# Patient Record
(demographics unavailable — no encounter records)

---

## 2025-03-20 NOTE — PLAN
[FreeTextEntry1] : PELVIC SONO ORDERED TO R/O  UTERINE FIBROID REFERRED TO HEMATOLOGY FOR CLOTTING WORK UP DUE TO MOTHERS HX. IF HEM WORKUP IS NORMAL AND SONO NORMAL PLAN OCPS.  START ON CHONG RISKS AND BENEFITS DISCUSSED REGARDING OCPS ALL OF THEIR QUESTIONS WERE ANSWERED SHE IS AGREEABLE WITH PLAN

## 2025-03-20 NOTE — HISTORY OF PRESENT ILLNESS
[FreeTextEntry1] : Patient is a 15 yo female here today to discuss OCPs due to menorrhagia and dysmenorrhea periods every 32 days   No hx of blood clotting disorders, bleeding disorders or migraine with aura.  mother hx of heart attack last year at 48 and incidental finding of DVT. no hem work up done per mother.

## 2025-03-20 NOTE — PHYSICAL EXAM
[Appropriately responsive] : appropriately responsive [Alert] : alert [Oriented x3] : oriented x3 [Examination Of The Breasts] : a normal appearance [Normal] : normal [No Discharge] : no discharge [No Masses] : no breast masses were palpable